# Patient Record
Sex: FEMALE | URBAN - METROPOLITAN AREA
[De-identification: names, ages, dates, MRNs, and addresses within clinical notes are randomized per-mention and may not be internally consistent; named-entity substitution may affect disease eponyms.]

---

## 2019-04-05 ENCOUNTER — OFFICE VISIT (OUTPATIENT)
Dept: OBGYN | Facility: CLINIC | Age: 59
End: 2019-04-05
Payer: COMMERCIAL

## 2019-04-05 VITALS
WEIGHT: 153 LBS | SYSTOLIC BLOOD PRESSURE: 182 MMHG | BODY MASS INDEX: 28.89 KG/M2 | HEIGHT: 61 IN | HEART RATE: 81 BPM | DIASTOLIC BLOOD PRESSURE: 91 MMHG

## 2019-04-05 DIAGNOSIS — N39.46 MIXED INCONTINENCE URGE AND STRESS (MALE)(FEMALE): Primary | ICD-10-CM

## 2019-04-05 PROCEDURE — G0145 SCR C/V CYTO,THINLAYER,RESCR: HCPCS | Performed by: OBSTETRICS & GYNECOLOGY

## 2019-04-05 PROCEDURE — T1013 SIGN LANG/ORAL INTERPRETER: HCPCS | Mod: U3,ZF

## 2019-04-05 PROCEDURE — G0123 SCREEN CERV/VAG THIN LAYER: HCPCS | Performed by: OBSTETRICS & GYNECOLOGY

## 2019-04-05 PROCEDURE — G0476 HPV COMBO ASSAY CA SCREEN: HCPCS | Performed by: OBSTETRICS & GYNECOLOGY

## 2019-04-05 PROCEDURE — G0463 HOSPITAL OUTPT CLINIC VISIT: HCPCS | Mod: 25

## 2019-04-05 RX ORDER — BLOOD-GLUCOSE METER
EACH MISCELLANEOUS
COMMUNITY
Start: 2019-03-01

## 2019-04-05 RX ORDER — LOSARTAN POTASSIUM 50 MG/1
50 TABLET ORAL
COMMUNITY
Start: 2019-02-23

## 2019-04-05 RX ORDER — AMLODIPINE BESYLATE 5 MG/1
5 TABLET ORAL
COMMUNITY
Start: 2017-07-10

## 2019-04-05 SDOH — HEALTH STABILITY: MENTAL HEALTH: HOW OFTEN DO YOU HAVE A DRINK CONTAINING ALCOHOL?: NEVER

## 2019-04-05 ASSESSMENT — MIFFLIN-ST. JEOR: SCORE: 1201.38

## 2019-04-05 ASSESSMENT — PAIN SCALES - GENERAL: PAINLEVEL: NO PAIN (0)

## 2019-04-05 NOTE — PROGRESS NOTES
"[facility] gynecology visit  2019     CC: leaking urine    HPI: Ms Matthews is a 58 year old  who presents to clinic to discuss leaking urine. She says this started 6 months ago and was never a problem before that. She is not entirely sure when she loses urine, sometimes because she has an urge and can't get to there restroom fast enough; other times with sneezing or coughing. No unusual discharge, bleeding, or pelvic/vaginal pain. No constipation or diarrhea; also denies loss of stool/flatulence.     PMHx notable for prediabetes treated with Metformin and HTN on amlodipine and losartan. Aortic insufficiency. Only recently had insurance and is getting back to medical care.    OBHx:    x4, one  demise. No extremely large infants or long labors    Gyn history  Sexually active: laughs, \"a long long time\" since sexual activity.   STIs/PID: denies  Pregnancies: as above  Periods: unsure about menopause, no bleeding since then  Last Pap: never.   Contraception: never     O:  Patient Vitals for the past 12 hrs:   BP Pulse Height Weight   19 1448 (!) 182/91 81 1.549 m (5' 1\") 69.4 kg (153 lb)     Gen: alert NAD oriented  : Normal appearing postmenopausal external genitalia, vagina and parous cervix. Prolapse as below. Valsalva/cough negative for leaking    A1c 5.6    POPQ * note had difficulty describing/performing Valsalva, may be more extreme than this  Aa -1   Ba -1  C -5  D -7  GH 3  PB 2  TVL 8  Ap -1  Bp -1    A/P  58 year old  with apparent mixed incontinence and no history of gynecologic care.   Counseling somewhat limited as [place]  was 15 minutes late to 30 minute visit.     #) urinary incontinence  #) stage 2 anterior/posterior wall prolapse  Mixed picture given her description of urgency and stress. It does seem unusual that this started suddenly 6 months ago and was never a problem before that. Prediabetes may also be contributing. Urine culture collected note urinalysis may " have blood because it was collected after Pap test. Referred to Urogynecology for more comprehensive discussion about therapeutic options; we had a brief discussion about surgical vs PT vs pessary vs no treatment for stress incontinence symptoms. Because of her difficulty changing/moving around the exam room and uncontrolled hypertension, I am hesitant to start anticholinergics or Myrbetriq today but this could be considered.   Prolapse does not seem bothersome.     #) needs cervical cancer screening  Pap performed today.     #) poorly controlled HTN  Has PCP follow up    Please call with abnormal results, letter with normal results.     Marilia Smith MD PGY4  OB/Gyn  4/5/2019, 4:33 PM    The Patient was seen in [facility] by    MARILIA SMITH ASTER.  I reviewed the history & exam. Assessment and plan were jointly made.    Anjelica Mejia MD

## 2019-04-05 NOTE — NURSING NOTE
Chief Complaint   Patient presents with     Establish Care     HX Type 2 Diabetes , C/O stress incontienance   Shari De Jesus LPN

## 2019-04-05 NOTE — LETTER
April 12, 2019      Amy Matthews  [address]   [facility] [address]    Dear Ms.Cassie,      I am happy to inform you that your recent cervical cancer screening test (PAP smear) was normal.      Preventative screenings such as this help to ensure your health for years to come. You should repeat a pap smear in 5 years, unless otherwise directed.      You will still need to return to the clinic every year for your annual exam and other preventive tests.     If you have additional questions regarding this result, please call our registered nurse,       Sincerely,      Marilia Coronel MD

## 2019-04-08 ENCOUNTER — TELEPHONE (OUTPATIENT)
Dept: OBGYN | Facility: CLINIC | Age: 59
End: 2019-04-08

## 2019-04-08 LAB
BACTERIA SPEC CULT: NORMAL
BACTERIA SPEC CULT: NORMAL
SPECIMEN SOURCE: NORMAL

## 2019-04-08 NOTE — TELEPHONE ENCOUNTER
Received call from [facility] that urine was not received. Checked with specimen log in lab and it was not collected. Informed micro to cancel orders due to specimen not being collected.

## 2019-04-09 LAB
COPATH REPORT: NORMAL
PAP: NORMAL

## 2019-04-11 LAB
FINAL DIAGNOSIS: NORMAL
HPV HR 12 DNA CVX QL NAA+PROBE: NEGATIVE
HPV16 DNA SPEC QL NAA+PROBE: NEGATIVE
HPV18 DNA SPEC QL NAA+PROBE: NEGATIVE
SPECIMEN DESCRIPTION: NORMAL
SPECIMEN SOURCE CVX/VAG CYTO: NORMAL

## 2019-04-17 ENCOUNTER — OFFICE VISIT (OUTPATIENT)
Dept: UROLOGY | Facility: CLINIC | Age: 59
End: 2019-04-17
Attending: OBSTETRICS & GYNECOLOGY
Payer: COMMERCIAL

## 2019-04-17 VITALS — WEIGHT: 155.7 LBS | DIASTOLIC BLOOD PRESSURE: 84 MMHG | BODY MASS INDEX: 29.42 KG/M2 | SYSTOLIC BLOOD PRESSURE: 160 MMHG

## 2019-04-17 DIAGNOSIS — R32 URINARY INCONTINENCE IN FEMALE: Primary | ICD-10-CM

## 2019-04-17 PROCEDURE — T1013 SIGN LANG/ORAL INTERPRETER: HCPCS | Mod: U3,ZF

## 2019-04-17 PROCEDURE — G0463 HOSPITAL OUTPT CLINIC VISIT: HCPCS | Mod: ZF

## 2019-04-17 ASSESSMENT — PAIN SCALES - GENERAL: PAINLEVEL: NO PAIN (0)

## 2019-04-17 NOTE — NURSING NOTE
Patient tested negative for urinary stress incontinence  Unable to void-  Performed simple catheter and 50 ml of urine was obtained.

## 2019-04-17 NOTE — PROGRESS NOTES
"2019    Referring Provider: Marilia Coronel MD    Primary Care Provider: No primary care provider on file.    CC: urinary incontinence     HPI:  Amy Matthews is a 58 year old female  with a history of diabetes mellitus on metformin (last A1C 5.6%) and HTN on losartan and amlodipine who presents for evaluation of her pelvic floor symptoms.  She was diagnosed with mixed urinary incontinence and stage 2 anterior/posterior wall prolapse by Dr. Marilia Coronel during her annual exam on 2019. She reports urinary incontinence intermittently for the last 6 months. Reports urinary incontinence typically occurs with coughing, sneezing, laughing, and/or physical activity. She wears an incontinence pad and changes it once daily- at times it is dry at the end of the day, often there are several drops of urine at the end of the day, and occasionally the pad is more saturated with urine. She states that her incontinence is bothersome to her and she is interested in non-surgical treatment. She denies dysuria, urinary urgency, urinary frequency, constipation, stool leakage, nocturia, difficulty emptying her bladder, vaginal pressure or bulge. She is only drinking approximately 2-3 glasses of water daily and urinating three times daily.     Prolapse:  Do you feel a vaginal bulge? No                                     Pressure? No  Do you have to place your fingers in the vagina or in the rectum to have a bowel movement? No    Stress Incontinence:  Do you leak urine with cough, sneeze, exercise? Yes  How often do you leak with cough, sneeze, exercise? \"once in a while\"  How much do you usually leak? A few drops  Do you wear a pad? Yes - changes once daily-- sometimes dry at the end of the day, sometimes only a drop of urine, sometimes it is truly wet at the end of the day  Impact to quality of life? It is bothersome    Urge Incontinence:  Do you often get sudden urges to urinate? No  How often do have urges? No  If " "so, do you leak with these urges? No    Voids/day: 3  Nocturia: 0  Fluid intake: 3 glasses  Caffeine: coffee 2x daily    Urinating:  Difficulty starting urination or strain to void? No  Weak or intermittent stream? No  Incomplete emptying or dribbling? No  Pain or burning with urination? No  Any blood in your urine? No    GI:  Constipation? No  Frequency stools: Daily  Straining for stools: No  Stool consistency: \"normal\"  Ever leak stool (Accidental Bowel Leakage)? No  History of irritable bowel or Crohn's? No    Sexual/Pain:  Are you currently having sex? No    Prior therapy:  Ever done pelvic floor physical therapy? No  Trial of medication? No  Have you ever tried a pessary? No    Medical History:  Do you have?   High Cholesterol? No  Diabetes? Yes  High Blood pressure? Yes  Recurrent UTIs? No  Sleep Apnea? No  Other medical problems: No    Surgical History:    Hysterectomy? No  Bladder Surgery? No  Other? epigastric hernia repair with mesh at [facility] in     OB/Gyn History:  Pregnancies? 4  Deliveries? 4  Vaginal 4  Section 0  Current birth control? Post-menopausal  When was the first day of your last period? >10 years ago  Last Pap smear? 2019- NIL, HPV negative Any abnormal? No  Last mammogram? Never  Last colonoscopy? Never    Medications/Vitamins/Supplements:     Current Outpatient Medications:      amLODIPine (NORVASC) 5 MG tablet, Take 5 mg by mouth, Disp: , Rfl:      Blood Glucose Monitoring Suppl (GLUCOCOM BLOOD GLUCOSE MONITOR) CATHERINE, as needed for blood glucose monitoring, Disp: , Rfl:      losartan (COZAAR) 50 MG tablet, Take 50 mg by mouth, Disp: , Rfl:      metFORMIN (GLUCOPHAGE) 500 MG tablet, Take 500 mg by mouth, Disp: , Rfl:      omeprazole (PRILOSEC) 20 MG DR capsule, Take 20 mg by mouth, Disp: , Rfl:     Drug Allergies: No  Latex Allergy: No  Iodine Allergy No    Family History: (list relationship and age at diagnosis)  Breast cancer? No  Ovarian cancer? No  Colon cancer? " No  Other? None    Social History:  Marital status: Single  Do you/ have you ever smoke(d)  cigarettes? No  Drink more than 1 alcoholic beverage a day?  No  Occupation? Retired    In the past 3 months have you regularly experienced:  Chest pain w/ walking/exercise? No                 Unusual headaches? No  Leg pain w/ walking/exercise? No                       Easy bruising? No  Difficulty breathing w/ walking/exercise? No  Problems with vision? Reports blurry vision when trying to read up close or see far away-- has appointment to see optometrist for evaluation in the near future. No known eye problems.   Dizziness, falls, or fainting? No   Excessive bleeding from cuts, gums, surgery? No    Past Medical History:   Diagnosis Date     Depression      Diabetes mellitus (H)      Hypertension        Past Surgical History:   Procedure Laterality Date     HERNIA REPAIR  2013    Open epigastric hernia repair with prolene mesh      Social History     Socioeconomic History     Marital status:      Spouse name: Not on file     Number of children: Not on file     Years of education: Not on file     Highest education level: Not on file   Occupational History     Not on file   Social Needs     Financial resource strain: Not on file     Food insecurity:     Worry: Not on file     Inability: Not on file     Transportation needs:     Medical: Not on file     Non-medical: Not on file   Tobacco Use     Smoking status: Never Smoker     Smokeless tobacco: Never Used   Substance and Sexual Activity     Alcohol use: No     Frequency: Never     Drug use: No     Sexual activity: Not Currently     Partners: Male     Birth control/protection: None   Lifestyle     Physical activity:     Days per week: Not on file     Minutes per session: Not on file     Stress: Not on file   Relationships     Social connections:     Talks on phone: Not on file     Gets together: Not on file     Attends Anabaptism service: Not on file     Active member  of club or organization: Not on file     Attends meetings of clubs or organizations: Not on file     Relationship status: Not on file     Intimate partner violence:     Fear of current or ex partner: Not on file     Emotionally abused: Not on file     Physically abused: Not on file     Forced sexual activity: Not on file   Other Topics Concern     Not on file   Social History Narrative     Not on file       Family History   Family history unknown: Yes       ROS    No Known Allergies    Current Outpatient Medications   Medication     amLODIPine (NORVASC) 5 MG tablet     Blood Glucose Monitoring Suppl (GLUCOCOM BLOOD GLUCOSE MONITOR) CATHERINE     losartan (COZAAR) 50 MG tablet     metFORMIN (GLUCOPHAGE) 500 MG tablet     omeprazole (PRILOSEC) 20 MG DR capsule     No current facility-administered medications for this visit.        Wt 70.6 kg (155 lb 11.2 oz)   BMI 29.42 kg/m   No LMP recorded. Patient is postmenopausal. Body mass index is 29.42 kg/m .  Patient is alert, comfortable in no acute distress, non-labored breathing.   Abdomen is soft, non-tender, non-distended, no CVAT.    Normal external female genitalia. The urethra was mobile and without mass.    She has good support on supine strain.  Speculum and bimanual exam are remarkable for post menopausal vaginal mucosa, mild prolapse unchanged with valsalva, normal parous cervix, no vaginal discharge.       3/5 kegels- right slightly greater than left    Negative SST  VOID 50 ml by cath  PVR 0 mL in and out cath    A/P: Amy Matthews is a 58 year old F with HTN, and prediabetes presenting who presents with symptoms suggestive of stress urinary incontinence.   - Discussed treatment options for JEET including no treatment, PFPT, pessary, and surgery. She is not interested in pessary or surgical management at this time. She would like to try PFPT - referral was made.   - Patient will follow up in clinic as needed following PFPT.     Scribed by Walter Johns, MS4 for   Toni Camargo     A total of 60 minutes were spent with the patient today, > 50% in counseling and coordination of care    Toni Camargo MD  Professor, OB/GYN  Urogynecologist    CC  No care team member to display  SELF, REFERRED

## 2019-04-17 NOTE — LETTER
Date:April 18, 2019      Patient was self referred, no letter generated. Do not send.        [facility] Health Information

## 2019-04-17 NOTE — LETTER
"2019       RE: Amy Matthews  [address]   [facility] [address]     Dear Colleague,    Thank you for referring your patient, Amy Matthews, to the [facility] at [facility]. Please see a copy of my visit note below.    2019    Referring Provider: Marilia Coronel MD    Primary Care Provider: No primary care provider on file.    CC: urinary incontinence     HPI:  Amy Matthews is a 58 year old female  with a history of diabetes mellitus on metformin (last A1C 5.6%) and HTN on losartan and amlodipine who presents for evaluation of her pelvic floor symptoms.  She was diagnosed with mixed urinary incontinence and stage 2 anterior/posterior wall prolapse by Dr. Marilia Coronel during her annual exam on 2019. She reports urinary incontinence intermittently for the last 6 months. Reports urinary incontinence typically occurs with coughing, sneezing, laughing, and/or physical activity. She wears an incontinence pad and changes it once daily- at times it is dry at the end of the day, often there are several drops of urine at the end of the day, and occasionally the pad is more saturated with urine. She states that her incontinence is bothersome to her and she is interested in non-surgical treatment. She denies dysuria, urinary urgency, urinary frequency, constipation, stool leakage, nocturia, difficulty emptying her bladder, vaginal pressure or bulge. She is only drinking approximately 2-3 glasses of water daily and urinating three times daily.     Prolapse:  Do you feel a vaginal bulge? No                                     Pressure? No  Do you have to place your fingers in the vagina or in the rectum to have a bowel movement? No    Stress Incontinence:  Do you leak urine with cough, sneeze, exercise? Yes  How often do you leak with cough, sneeze, exercise? \"once in a while\"  How much do you usually leak? A few drops  Do " "you wear a pad? Yes - changes once daily-- sometimes dry at the end of the day, sometimes only a drop of urine, sometimes it is truly wet at the end of the day  Impact to quality of life? It is bothersome    Urge Incontinence:  Do you often get sudden urges to urinate? No  How often do have urges? No  If so, do you leak with these urges? No    Voids/day: 3  Nocturia: 0  Fluid intake: 3 glasses  Caffeine: coffee 2x daily    Urinating:  Difficulty starting urination or strain to void? No  Weak or intermittent stream? No  Incomplete emptying or dribbling? No  Pain or burning with urination? No  Any blood in your urine? No    GI:  Constipation? No  Frequency stools: Daily  Straining for stools: No  Stool consistency: \"normal\"  Ever leak stool (Accidental Bowel Leakage)? No  History of irritable bowel or Crohn's? No    Sexual/Pain:  Are you currently having sex? No    Prior therapy:  Ever done pelvic floor physical therapy? No  Trial of medication? No  Have you ever tried a pessary? No    Medical History:  Do you have?   High Cholesterol? No  Diabetes? Yes  High Blood pressure? Yes  Recurrent UTIs? No  Sleep Apnea? No  Other medical problems: No    Surgical History:    Hysterectomy? No  Bladder Surgery? No  Other? epigastric hernia repair with mesh at [facility] in     OB/Gyn History:  Pregnancies? 4  Deliveries? 4  Vaginal 4  Section 0  Current birth control? Post-menopausal  When was the first day of your last period? >10 years ago  Last Pap smear? 2019- NIL, HPV negative Any abnormal? No  Last mammogram? Never  Last colonoscopy? Never    Medications/Vitamins/Supplements:     Current Outpatient Medications:      amLODIPine (NORVASC) 5 MG tablet, Take 5 mg by mouth, Disp: , Rfl:      Blood Glucose Monitoring Suppl (GLUCOCOM BLOOD GLUCOSE MONITOR) CATHERINE, as needed for blood glucose monitoring, Disp: , Rfl:      losartan (COZAAR) 50 MG tablet, Take 50 mg by mouth, Disp: , Rfl:      metFORMIN (GLUCOPHAGE) 500 MG " tablet, Take 500 mg by mouth, Disp: , Rfl:      omeprazole (PRILOSEC) 20 MG DR capsule, Take 20 mg by mouth, Disp: , Rfl:     Drug Allergies: No  Latex Allergy: No  Iodine Allergy No    Family History: (list relationship and age at diagnosis)  Breast cancer? No  Ovarian cancer? No  Colon cancer? No  Other? None    Social History:  Marital status: Single  Do you/ have you ever smoke(d)  cigarettes? No  Drink more than 1 alcoholic beverage a day?  No  Occupation? Retired    In the past 3 months have you regularly experienced:  Chest pain w/ walking/exercise? No                 Unusual headaches? No  Leg pain w/ walking/exercise? No                       Easy bruising? No  Difficulty breathing w/ walking/exercise? No  Problems with vision? Reports blurry vision when trying to read up close or see far away-- has appointment to see optometrist for evaluation in the near future. No known eye problems.   Dizziness, falls, or fainting? No   Excessive bleeding from cuts, gums, surgery? No    Past Medical History:   Diagnosis Date     Depression      Diabetes mellitus (H)      Hypertension        Past Surgical History:   Procedure Laterality Date     HERNIA REPAIR  2013    Open epigastric hernia repair with prolene mesh      Social History     Socioeconomic History     Marital status:      Spouse name: Not on file     Number of children: Not on file     Years of education: Not on file     Highest education level: Not on file   Occupational History     Not on file   Social Needs     Financial resource strain: Not on file     Food insecurity:     Worry: Not on file     Inability: Not on file     Transportation needs:     Medical: Not on file     Non-medical: Not on file   Tobacco Use     Smoking status: Never Smoker     Smokeless tobacco: Never Used   Substance and Sexual Activity     Alcohol use: No     Frequency: Never     Drug use: No     Sexual activity: Not Currently     Partners: Male     Birth control/protection:  None   Lifestyle     Physical activity:     Days per week: Not on file     Minutes per session: Not on file     Stress: Not on file   Relationships     Social connections:     Talks on phone: Not on file     Gets together: Not on file     Attends Islam service: Not on file     Active member of club or organization: Not on file     Attends meetings of clubs or organizations: Not on file     Relationship status: Not on file     Intimate partner violence:     Fear of current or ex partner: Not on file     Emotionally abused: Not on file     Physically abused: Not on file     Forced sexual activity: Not on file   Other Topics Concern     Not on file   Social History Narrative     Not on file       Family History   Family history unknown: Yes       ROS    No Known Allergies    Current Outpatient Medications   Medication     amLODIPine (NORVASC) 5 MG tablet     Blood Glucose Monitoring Suppl (GLUCOCOM BLOOD GLUCOSE MONITOR) CATHERINE     losartan (COZAAR) 50 MG tablet     metFORMIN (GLUCOPHAGE) 500 MG tablet     omeprazole (PRILOSEC) 20 MG DR capsule     No current facility-administered medications for this visit.        Wt 70.6 kg (155 lb 11.2 oz)   BMI 29.42 kg/m    No LMP recorded. Patient is postmenopausal. Body mass index is 29.42 kg/m .  Patient is alert, comfortable in no acute distress, non-labored breathing.   Abdomen is soft, non-tender, non-distended, no CVAT.    Normal external female genitalia. The urethra was mobile and without mass.    She has good support on supine strain.  Speculum and bimanual exam are remarkable for post menopausal vaginal mucosa, mild prolapse unchanged with valsalva, normal parous cervix, no vaginal discharge.       3/5 kegels- right slightly greater than left    Negative SST  VOID 50 ml by cath  PVR 0 mL in and out cath    A/P: Amy Matthews is a 58 year old F with HTN, and prediabetes presenting who presents with symptoms suggestive of stress urinary incontinence.   - Discussed  treatment options for JEET including no treatment, PFPT, pessary, and surgery. She is not interested in pessary or surgical management at this time. She would like to try PFPT - referral was made.   - Patient will follow up in clinic as needed following PFPT.     Scribed by Walter Johns, MS4 for Dr. Toni Camargo     A total of 60 minutes were spent with the patient today, > 50% in counseling and coordination of care    Toni Camargo MD  Professor, OB/GYN  Urogynecologist      No care team member to display  SELF, REFERRED              Again, thank you for allowing me to participate in the care of your patient.      Sincerely,    Toni Camargo MD

## 2019-09-12 ENCOUNTER — ANCILLARY PROCEDURE (OUTPATIENT)
Dept: MAMMOGRAPHY | Facility: CLINIC | Age: 59
End: 2019-09-12
Attending: OBSTETRICS & GYNECOLOGY
Payer: COMMERCIAL

## 2019-09-12 DIAGNOSIS — Z12.31 VISIT FOR SCREENING MAMMOGRAM: ICD-10-CM

## 2019-09-12 PROCEDURE — 77067 SCR MAMMO BI INCL CAD: CPT
